# Patient Record
Sex: MALE | Race: WHITE | Employment: FULL TIME | URBAN - METROPOLITAN AREA
[De-identification: names, ages, dates, MRNs, and addresses within clinical notes are randomized per-mention and may not be internally consistent; named-entity substitution may affect disease eponyms.]

---

## 2023-09-20 ENCOUNTER — OFFICE VISIT (OUTPATIENT)
Dept: FAMILY MEDICINE CLINIC | Age: 23
End: 2023-09-20
Payer: COMMERCIAL

## 2023-09-20 VITALS
TEMPERATURE: 98.6 F | OXYGEN SATURATION: 99 % | HEART RATE: 73 BPM | BODY MASS INDEX: 18.01 KG/M2 | WEIGHT: 133 LBS | HEIGHT: 72 IN | SYSTOLIC BLOOD PRESSURE: 104 MMHG | DIASTOLIC BLOOD PRESSURE: 68 MMHG

## 2023-09-20 DIAGNOSIS — J40 BRONCHITIS: Primary | ICD-10-CM

## 2023-09-20 PROCEDURE — G8427 DOCREV CUR MEDS BY ELIG CLIN: HCPCS | Performed by: NURSE PRACTITIONER

## 2023-09-20 PROCEDURE — G8419 CALC BMI OUT NRM PARAM NOF/U: HCPCS | Performed by: NURSE PRACTITIONER

## 2023-09-20 PROCEDURE — 99203 OFFICE O/P NEW LOW 30 MIN: CPT | Performed by: NURSE PRACTITIONER

## 2023-09-20 PROCEDURE — 1036F TOBACCO NON-USER: CPT | Performed by: NURSE PRACTITIONER

## 2023-09-20 RX ORDER — AZITHROMYCIN 250 MG/1
TABLET, FILM COATED ORAL
Qty: 1 PACKET | Refills: 0 | Status: SHIPPED | OUTPATIENT
Start: 2023-09-20

## 2023-09-20 ASSESSMENT — ENCOUNTER SYMPTOMS
COUGH: 1
VOMITING: 0
NAUSEA: 0
SINUS PRESSURE: 0
HEARTBURN: 0
CHEST TIGHTNESS: 1
SHORTNESS OF BREATH: 1
DIARRHEA: 0
WHEEZING: 0
SINUS PAIN: 0
HEMOPTYSIS: 0
RHINORRHEA: 0
SORE THROAT: 0

## 2023-09-20 NOTE — PROGRESS NOTES
Chuckie Mention   24 y.o.  male  [id number]      Chief Complaint   Patient presents with    Rib Pain     Left lung area  Pt states he was sick with sinus but went hiking and now has lung pain with deep breaths. Pt sounds congested-runny stuffy nose. Subjective:  23 y.o.male is here for a follow up. He has the following chronic/acute medical problems: There is no problem list on file for this patient. Cough  This is a new problem. The current episode started 1 to 4 weeks ago (1 week ago). The problem has been unchanged. The problem occurs constantly. The cough is Productive of sputum. Associated symptoms include nasal congestion and shortness of breath (little bit). Pertinent negatives include no chest pain, chills, ear congestion, ear pain, fever, headaches, heartburn, hemoptysis, myalgias, postnasal drip, rash, rhinorrhea, sore throat, sweats, weight loss or wheezing. Nothing aggravates the symptoms. Treatments tried: Mucinex. The treatment provided no relief. Review of Systems   Constitutional:  Positive for appetite change (decreased). Negative for chills, fatigue, fever and weight loss. HENT:  Positive for congestion. Negative for ear pain, postnasal drip, rhinorrhea, sinus pressure, sinus pain, sneezing and sore throat. Respiratory:  Positive for cough, chest tightness (states when he takes a deep breath will have lung pain) and shortness of breath (little bit). Negative for hemoptysis and wheezing. Cardiovascular:  Negative for chest pain and palpitations. Gastrointestinal:  Negative for diarrhea, heartburn, nausea and vomiting. Musculoskeletal:  Negative for myalgias. Skin:  Negative for rash. Neurological:  Negative for dizziness, light-headedness and headaches.        Current Outpatient Medications   Medication Sig Dispense Refill    azithromycin (ZITHROMAX) 250 MG tablet Take 2 tabs (500 mg) on Day 1, and take 1 tab (250 mg) on days 2 through 5. 1 packet 0     No current